# Patient Record
Sex: FEMALE | Race: WHITE | NOT HISPANIC OR LATINO | Employment: OTHER | URBAN - METROPOLITAN AREA
[De-identification: names, ages, dates, MRNs, and addresses within clinical notes are randomized per-mention and may not be internally consistent; named-entity substitution may affect disease eponyms.]

---

## 2023-05-23 LAB
6-ACETYLMORPHINE: <25 NG/ML
7-AMINOCLONAZEPAM: >1000 NG/ML
ALPHA-HYDROXYALPRAZOLAM: <25 NG/ML
ALPHA-HYDROXYMIDAZOLAM: <25 NG/ML
ALPRAZOLAM: <25 NG/ML
AMPHETAMINE (PRESENCE) IN URINE BY SCREEN METHOD: ABNORMAL
BARBITURATES PRESENCE IN URINE BY SCREEN METHOD: ABNORMAL
CANNABINOIDS IN URINE BY SCREEN METHOD: ABNORMAL
CHLORDIAZEPOXIDE: <25 NG/ML
CLONAZEPAM: 40 NG/ML
COCAINE (PRESENCE) IN URINE BY SCREEN METHOD: ABNORMAL
CODEINE: <50 NG/ML
CREATINE, URINE FOR DRUG: 209.2 MG/DL
DIAZEPAM: <25 NG/ML
DRUG SCREEN COMMENT URINE: ABNORMAL
EDDP: <25 NG/ML
FENTANYL CONFIRMATION, URINE: <2.5 NG/ML
HYDROCODONE: <25 NG/ML
HYDROMORPHONE: <25 NG/ML
LORAZEPAM: <25 NG/ML
METHADONE CONFIRMATION,URINE: <25 NG/ML
MIDAZOLAM: <25 NG/ML
MORPHINE URINE: <50 NG/ML
NORDIAZEPAM: <25 NG/ML
NORFENTANYL: <2.5 NG/ML
NORHYDROCODONE: <25 NG/ML
NOROXYCODONE: >1000 NG/ML
O-DESMETHYLTRAMADOL: <50 NG/ML
OXAZEPAM: <25 NG/ML
OXYCODONE: 1187 NG/ML
OXYMORPHONE: 869 NG/ML
PHENCYCLIDINE (PRESENCE) IN URINE BY SCREEN METHOD: ABNORMAL
TEMAZEPAM: <25 NG/ML
TRAMADOL: <50 NG/ML
ZOLPIDEM METABOLITE (ZCA): <25 NG/ML
ZOLPIDEM: <25 NG/ML

## 2023-10-27 ENCOUNTER — TELEPHONE (OUTPATIENT)
Dept: NEUROLOGY | Facility: HOSPITAL | Age: 53
End: 2023-10-27

## 2023-10-27 NOTE — TELEPHONE ENCOUNTER
[facility] called on a refill for pt.  Methocarbamol 500mg phone [phone], fax [phone]

## 2023-11-27 DIAGNOSIS — M62.89 MUSCLE STIFFNESS: ICD-10-CM

## 2023-11-27 RX ORDER — BACLOFEN 10 MG/1
30 TABLET ORAL 3 TIMES DAILY
COMMUNITY
End: 2023-11-27 | Stop reason: SDUPTHER

## 2023-11-27 RX ORDER — BACLOFEN 10 MG/1
30 TABLET ORAL 3 TIMES DAILY
Qty: 270 TABLET | Refills: 11 | Status: SHIPPED | OUTPATIENT
Start: 2023-11-27 | End: 2024-11-26

## 2023-12-04 RX ORDER — NORTRIPTYLINE HYDROCHLORIDE 25 MG/1
75 CAPSULE ORAL NIGHTLY
Qty: 270 CAPSULE | Refills: 3 | Status: SHIPPED | OUTPATIENT
Start: 2023-12-04 | End: 2024-12-03

## 2024-01-02 ENCOUNTER — TELEPHONE (OUTPATIENT)
Dept: NEUROLOGY | Facility: HOSPITAL | Age: 54
End: 2024-01-02

## 2024-01-16 DIAGNOSIS — G25.82 STIFF PERSON SYNDROME: ICD-10-CM

## 2024-01-16 RX ORDER — CLONAZEPAM 1 MG/1
3 TABLET ORAL NIGHTLY
COMMUNITY
End: 2024-01-16 | Stop reason: SDUPTHER

## 2024-01-16 RX ORDER — CLONAZEPAM 1 MG/1
3 TABLET ORAL NIGHTLY
Qty: 270 TABLET | Refills: 1 | Status: SHIPPED | OUTPATIENT
Start: 2024-01-16

## 2024-03-21 ENCOUNTER — TELEPHONE (OUTPATIENT)
Dept: NEUROLOGY | Facility: HOSPITAL | Age: 54
End: 2024-03-21
Payer: MEDICARE

## 2024-03-21 NOTE — TELEPHONE ENCOUNTER
I returned Karyna's call regarding her port used for infusions. Recently the infusion nurse cho had difficulty accessing the port at times, and there also seem to be indications that it could be blocked a little. I instructed her to reach out to the office that placed the port so that they could properly assess the problem and correct it. She expressed an understanding, and said she would contact them.

## 2024-05-30 DIAGNOSIS — T82.9XXS: Primary | ICD-10-CM

## 2024-06-04 DIAGNOSIS — G25.82 STIFF PERSON SYNDROME WITH POSITIVE GLUTAMIC ACID DECARBOXYLASE (GAD) ANTIBODY: Primary | ICD-10-CM

## 2024-06-04 DIAGNOSIS — Z79.899 LONG-TERM USE OF HIGH-RISK MEDICATION: ICD-10-CM

## 2024-06-04 DIAGNOSIS — R76.0 STIFF PERSON SYNDROME WITH POSITIVE GLUTAMIC ACID DECARBOXYLASE (GAD) ANTIBODY: Primary | ICD-10-CM

## 2024-06-04 RX ORDER — TIZANIDINE HYDROCHLORIDE 4 MG/1
8 CAPSULE, GELATIN COATED ORAL 3 TIMES DAILY
COMMUNITY
End: 2024-06-04 | Stop reason: SDUPTHER

## 2024-06-05 RX ORDER — TIZANIDINE HYDROCHLORIDE 4 MG/1
8 CAPSULE, GELATIN COATED ORAL 3 TIMES DAILY
Qty: 180 CAPSULE | Refills: 11 | Status: SHIPPED | OUTPATIENT
Start: 2024-06-05 | End: 2025-06-05

## 2024-06-14 ENCOUNTER — APPOINTMENT (OUTPATIENT)
Dept: CARDIOLOGY | Facility: HOSPITAL | Age: 54
End: 2024-06-14
Payer: MEDICARE

## 2024-06-17 NOTE — PROGRESS NOTES
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 She was previously seen 1/12/23 and continues to be stable on her current medication and IVIG regimen.    Since her previous appointment, she reports having spasms from cheeks to chin about 3 x weekly, lasting a few minutes and resolves.  She continues to feel that walking longer distances, standing longer period of times, and climbing stairs have been more difficult even with her forearm crutch with increasing pain that stems from her back. She denies any falls but feels that her back will give out.  She has difficulty associating which issue is causing this but her muscle stiffness and spasms have been controlled on her current medication and IVIG regimen. She continues to take Baclofen 30 mg 3 x daily, Tizanidine 8 mg 3 x daily, clonazepam 3 mg at bedtime, Methocarbamol 500 mg 3 x daily. She tolerates her medications well with little to no side effects.     She continues to receive pulse IVIG 40 G daily x 2 days every 2 weeks through [facility] which helps some with her stiffness and weakness throughout.    To recap, she has had almost 20-year history of muscle stiffness and spasms that started in the neck and spread into her upper limbs and lower limbs. Diagnosis was ultimately made more than 11 years ago with elevated glutamic acid decarboxylase antibody. She was started on pulse IVIG in addition to baclofen, and tizanidine. She has been ordered prednisone in the past which seemed to have little to no improvement to her symptoms.     Otherwise, the past medical history, social history, and review of systems were reviewed. There are no significant changes.     Medications:     Current Outpatient  Medications:     baclofen (Lioresal) 10 mg tablet, Take 3 tablets by mouth three times daily at 8am, 1pm and 11pm., Disp: 270 tablet, Rfl: 10    baclofen (Lioresal) 10 mg tablet, Take 3 tablets (30 mg) by mouth 3 times a day. To take at 8am 1pm and 11pm, Disp: 270 tablet, Rfl: 11    clonazePAM (KlonoPIN) 1 mg tablet, Take 3 tablets (3 mg) by mouth once daily at bedtime. To take at 11 pm, Disp: 270 tablet, Rfl: 1    nortriptyline (Pamelor) 25 mg capsule, Take 3 capsules (75 mg) by mouth once daily at bedtime., Disp: 270 capsule, Rfl: 3    tiZANidine (Zanaflex) 4 mg capsule, Take 2 capsules (8 mg) by mouth 3 times a day., Disp: 180 capsule, Rfl: 11     Neuromuscular Exam:     The neurological examination was limited since this was a telemedicine visit.  The patient was alert with normal speech, cognition and fund of knowledge.     Impression:    Ms. LOLI KUNZ has CELSO-65 positive stiff person syndrome diagnosed >20 years ago with symptomatology going many years prior. She has no diabetes mellitus or history of seizure. She has been maintained on antispasticity agents as well as pulse IVIG. She is doing fairly well on this regimen. She had a Mediport which was replaced in June 2022.  This is not working now and she will need a new Mediport.  This will be her third Mediport.  We discussed replacing the Mediport versus trying subcutaneous immunoglobulin.  She elected to do the Mediport at this time.    Plan:    1.Continue intravenous immunoglobulin 40 g daily x 2 days every 2 weeks through [facility].     2. Continue baclofen 10 mg 3 times daily, tizanidine 4 mg 3 times daily and methocarbamol 500 mg 3 times daily and clonazepam 1 mg tab 3 tabs at bedtime.     3.  Consult interventional radiology regarding Mediport check and possible replacement.  An order was placed.     She will return to see me in 3 months preferably in person. She will call for questions.    I have considered the risks of abuse, dependence,  addiction, and diversion. Through further review, I believe it is clinically appropriate for Karyna Lares to be prescribed this medication. I have personally reviewed the OARRS report.      Katy Barragan M.D., F.A.C.P.   Director, [facility] & EM[facility] laboratory   [facility]   [facility]   Professor of Neurology   [facility] [employer]       The total appointment time today was 30 minutes on the day of the visit completing the review of the medical record, obtaining history a counseling and education, placing orders, independently reviewing results, communicating with the patient/family and other providers, coordinating care and performing appropriate clinical documentation.

## 2024-06-18 ENCOUNTER — TELEMEDICINE (OUTPATIENT)
Dept: NEUROLOGY | Facility: HOSPITAL | Age: 54
End: 2024-06-18
Payer: MEDICARE

## 2024-06-18 VITALS — WEIGHT: 280 LBS | BODY MASS INDEX: 43.85 KG/M2

## 2024-06-18 DIAGNOSIS — G25.82 STIFF PERSON SYNDROME WITH POSITIVE GLUTAMIC ACID DECARBOXYLASE (GAD) ANTIBODY: Primary | ICD-10-CM

## 2024-06-18 DIAGNOSIS — R76.0 STIFF PERSON SYNDROME WITH POSITIVE GLUTAMIC ACID DECARBOXYLASE (GAD) ANTIBODY: Primary | ICD-10-CM

## 2024-06-18 PROCEDURE — 99214 OFFICE O/P EST MOD 30 MIN: CPT | Mod: GT,95 | Performed by: PSYCHIATRY & NEUROLOGY

## 2024-06-18 PROCEDURE — 99214 OFFICE O/P EST MOD 30 MIN: CPT | Performed by: PSYCHIATRY & NEUROLOGY

## 2024-06-18 RX ORDER — METHOCARBAMOL 500 MG/1
500 TABLET, FILM COATED ORAL 3 TIMES DAILY
COMMUNITY

## 2024-06-19 ENCOUNTER — LAB (OUTPATIENT)
Dept: LAB | Facility: LAB | Age: 54
End: 2024-06-19
Payer: MEDICARE

## 2024-06-19 DIAGNOSIS — Z79.899 LONG-TERM USE OF HIGH-RISK MEDICATION: ICD-10-CM

## 2024-06-19 LAB
AMPHETAMINES UR QL SCN: ABNORMAL
BARBITURATES UR QL SCN: ABNORMAL
BENZODIAZ UR QL SCN: ABNORMAL
BZE UR QL SCN: ABNORMAL
CANNABINOIDS UR QL SCN: ABNORMAL
FENTANYL+NORFENTANYL UR QL SCN: ABNORMAL
METHADONE UR QL SCN: ABNORMAL
OPIATES UR QL SCN: ABNORMAL
OXYCODONE+OXYMORPHONE UR QL SCN: ABNORMAL
PCP UR QL SCN: ABNORMAL

## 2024-06-19 PROCEDURE — 80361 OPIATES 1 OR MORE: CPT

## 2024-06-19 PROCEDURE — 80365 DRUG SCREENING OXYCODONE: CPT

## 2024-06-19 PROCEDURE — 80307 DRUG TEST PRSMV CHEM ANLYZR: CPT

## 2024-06-19 PROCEDURE — 80346 BENZODIAZEPINES1-12: CPT

## 2024-06-19 RX ORDER — SODIUM CHLORIDE 9 MG/ML
100 INJECTION, SOLUTION INTRAVENOUS CONTINUOUS
Status: CANCELLED | OUTPATIENT
Start: 2024-06-19

## 2024-06-20 ENCOUNTER — HOSPITAL ENCOUNTER (OUTPATIENT)
Dept: CARDIOLOGY | Facility: HOSPITAL | Age: 54
Discharge: HOME | End: 2024-06-20
Payer: MEDICARE

## 2024-06-20 VITALS
OXYGEN SATURATION: 97 % | WEIGHT: 279.98 LBS | DIASTOLIC BLOOD PRESSURE: 68 MMHG | BODY MASS INDEX: 43.94 KG/M2 | TEMPERATURE: 99.1 F | RESPIRATION RATE: 18 BRPM | SYSTOLIC BLOOD PRESSURE: 153 MMHG | HEIGHT: 67 IN | HEART RATE: 90 BPM

## 2024-06-20 DIAGNOSIS — T82.9XXS: ICD-10-CM

## 2024-06-20 PROCEDURE — 2780000003 HC OR 278 NO HCPCS

## 2024-06-20 PROCEDURE — 7100000010 HC PHASE TWO TIME - EACH INCREMENTAL 1 MINUTE

## 2024-06-20 PROCEDURE — 2720000007 HC OR 272 NO HCPCS

## 2024-06-20 PROCEDURE — 2500000004 HC RX 250 GENERAL PHARMACY W/ HCPCS (ALT 636 FOR OP/ED): Performed by: RADIOLOGY

## 2024-06-20 PROCEDURE — 36590 REMOVAL TUNNELED CV CATH: CPT

## 2024-06-20 PROCEDURE — 76937 US GUIDE VASCULAR ACCESS: CPT

## 2024-06-20 PROCEDURE — C1788 PORT, INDWELLING, IMP: HCPCS

## 2024-06-20 PROCEDURE — 2500000005 HC RX 250 GENERAL PHARMACY W/O HCPCS: Performed by: RADIOLOGY

## 2024-06-20 PROCEDURE — 99153 MOD SED SAME PHYS/QHP EA: CPT

## 2024-06-20 PROCEDURE — C1894 INTRO/SHEATH, NON-LASER: HCPCS

## 2024-06-20 PROCEDURE — 7100000009 HC PHASE TWO TIME - INITIAL BASE CHARGE

## 2024-06-20 PROCEDURE — 77001 FLUOROGUIDE FOR VEIN DEVICE: CPT

## 2024-06-20 PROCEDURE — 99152 MOD SED SAME PHYS/QHP 5/>YRS: CPT

## 2024-06-20 PROCEDURE — 36561 INSERT TUNNELED CV CATH: CPT

## 2024-06-20 RX ORDER — ESOMEPRAZOLE MAGNESIUM 40 MG/1
40 CAPSULE, DELAYED RELEASE ORAL
COMMUNITY
Start: 2017-04-11

## 2024-06-20 RX ORDER — LIDOCAINE HYDROCHLORIDE AND EPINEPHRINE 20; 10 MG/ML; UG/ML
INJECTION, SOLUTION INFILTRATION; PERINEURAL AS NEEDED
Status: DISCONTINUED | OUTPATIENT
Start: 2024-06-20 | End: 2024-06-20 | Stop reason: HOSPADM

## 2024-06-20 RX ORDER — TRAZODONE HYDROCHLORIDE 50 MG/1
1 TABLET ORAL NIGHTLY
COMMUNITY
Start: 2022-05-24

## 2024-06-20 RX ORDER — OXYCODONE AND ACETAMINOPHEN 10; 325 MG/1; MG/1
1 TABLET ORAL 3 TIMES DAILY PRN
COMMUNITY

## 2024-06-20 RX ORDER — FENTANYL CITRATE 50 UG/ML
INJECTION, SOLUTION INTRAMUSCULAR; INTRAVENOUS AS NEEDED
Status: DISCONTINUED | OUTPATIENT
Start: 2024-06-20 | End: 2024-06-20 | Stop reason: HOSPADM

## 2024-06-20 RX ORDER — DOCUSATE SODIUM 100 MG/1
100 CAPSULE, LIQUID FILLED ORAL 2 TIMES DAILY
COMMUNITY
Start: 2023-08-15

## 2024-06-20 RX ORDER — LEVOTHYROXINE SODIUM 125 UG/1
125 TABLET ORAL
COMMUNITY
Start: 2022-05-24

## 2024-06-20 RX ORDER — CELECOXIB 200 MG/1
200 CAPSULE ORAL DAILY
COMMUNITY
Start: 2023-04-20

## 2024-06-20 RX ORDER — IMMUNE GLOBULIN (HUMAN) 10 G/100ML
20 INJECTION INTRAVENOUS; SUBCUTANEOUS ONCE
COMMUNITY
Start: 2024-04-08

## 2024-06-20 RX ORDER — HEPARIN 100 UNIT/ML
SYRINGE INTRAVENOUS AS NEEDED
Status: DISCONTINUED | OUTPATIENT
Start: 2024-06-20 | End: 2024-06-20 | Stop reason: HOSPADM

## 2024-06-20 RX ORDER — MIDAZOLAM HYDROCHLORIDE 1 MG/ML
INJECTION, SOLUTION INTRAMUSCULAR; INTRAVENOUS AS NEEDED
Status: DISCONTINUED | OUTPATIENT
Start: 2024-06-20 | End: 2024-06-20 | Stop reason: HOSPADM

## 2024-06-20 RX ORDER — BIMATOPROST 0.1 MG/ML
1 SOLUTION/ DROPS OPHTHALMIC NIGHTLY
COMMUNITY
Start: 2022-05-21 | End: 2024-07-07

## 2024-06-20 RX ORDER — SOLIFENACIN SUCCINATE 10 MG/1
10 TABLET, FILM COATED ORAL
COMMUNITY
Start: 2023-07-17

## 2024-06-20 RX ORDER — GOLIMUMAB 50 MG/4ML
2 SOLUTION INTRAVENOUS
COMMUNITY
Start: 2023-03-09

## 2024-06-20 RX ORDER — LEVOTHYROXINE SODIUM 125 UG/1
125 TABLET ORAL DAILY
COMMUNITY
End: 2024-06-20 | Stop reason: WASHOUT

## 2024-06-20 RX ORDER — SODIUM CHLORIDE 9 MG/ML
INJECTION, SOLUTION INTRAVENOUS CONTINUOUS PRN
Status: COMPLETED | OUTPATIENT
Start: 2024-06-20 | End: 2024-06-20

## 2024-06-20 ASSESSMENT — PAIN - FUNCTIONAL ASSESSMENT
PAIN_FUNCTIONAL_ASSESSMENT: 0-10
PAIN_FUNCTIONAL_ASSESSMENT: 0-10

## 2024-06-20 ASSESSMENT — PAIN SCALES - GENERAL
PAINLEVEL_OUTOF10: 0 - NO PAIN
PAINLEVEL_OUTOF10: 0 - NO PAIN

## 2024-06-20 ASSESSMENT — ENCOUNTER SYMPTOMS
CONSTITUTIONAL NEGATIVE: 1
BACK PAIN: 1
ENDOCRINE NEGATIVE: 1
GASTROINTESTINAL NEGATIVE: 1
NERVOUS/ANXIOUS: 1
ALLERGIC/IMMUNOLOGIC NEGATIVE: 1
HEMATOLOGIC/LYMPHATIC NEGATIVE: 1
EYES NEGATIVE: 1
APNEA: 1
CARDIOVASCULAR NEGATIVE: 1

## 2024-06-20 NOTE — H&P
History Of Present Illness  Karyna Lares is a 53 y.o. female presenting for CVC port check with Dr. Zepeda 6/20/24. PMHx significant for Stiff Person Syndrome, Ankylosing Spondylitis, Bipolar disorder, depression, GERD, OA, hypothyroidism, RAMONA.      Past Medical History  She has a past medical history of Ankylosing spondylitis (Multi), Arthritis, ASCUS of cervix with negative high risk HPV, Bipolar 1 disorder (Multi), Depressive disorder, Deviated septum, Encounter for adjustment and management of vascular access device (06/08/2022), GERD (gastroesophageal reflux disease), Glaucoma, History of open reduction and internal fixation (ORIF) procedure, Hypothyroid, Migraine, RAMONA (obstructive sleep apnea), Osteoarthritis, Pain in right wrist (08/07/2017), Pain in unspecified elbow (06/10/2014), Sleep apnea, Status post osteotomy, Stiff person syndrome with positive glutamic acid decarboxylase (CELSO) antibody, and Stress incontinence (female) (male).    Surgical History  She has a past surgical history that includes Other surgical history (06/08/2022); Other surgical history (06/08/2022); Other surgical history (06/08/2022); Bladder surgery; Tonsillectomy; Nasal septum surgery; Central Line (Left); Wrist surgery; Cataract extraction (Right); Ulnar nerve transposition; and Hysterectomy.     Social History  She reports that she has never smoked. She has never used smokeless tobacco. She reports that she does not use drugs. No history on file for alcohol use.    Family History  No family history on file.     Allergies  Tegaderm ag mesh [silver] and Sulfa (sulfonamide antibiotics)    Home Medications  Current Outpatient Medications on File Prior to Encounter   Medication Sig Dispense Refill    baclofen (Lioresal) 10 mg tablet Take 3 tablets by mouth three times daily at 8am, 1pm and 11pm. 270 tablet 10    celecoxib (CeleBREX) 200 mg capsule Take 1 capsule (200 mg) by mouth once daily.      clonazePAM (KlonoPIN) 1 mg tablet  Take 3 tablets (3 mg) by mouth once daily at bedtime. To take at 11 pm 270 tablet 1    docusate sodium (Colace) 100 mg capsule Take 1 capsule (100 mg) by mouth 2 times a day.      esomeprazole (NexIUM) 40 mg DR capsule Take 1 capsule (40 mg) by mouth once daily in the morning. Take before meals.      estrogens, conjugated, (Premarin) 0.3 mg tablet Take 1 tablet (0.3 mg) by mouth once daily.      Gamunex-C 20 gram/200 mL (10 %) solution Infuse 200 mL (20 g) into a venous catheter 1 time.      levothyroxine (Synthroid, Levoxyl) 125 mcg tablet Take 1 tablet (125 mcg) by mouth once daily.      linaCLOtide (Linzess) 290 mcg capsule Take 1 capsule (290 mcg) by mouth once daily.      Lumigan 0.01 % ophthalmic solution Administer 1 drop into affected eye(s) once daily at bedtime.      methocarbamol (Robaxin) 500 mg tablet Take 1 tablet (500 mg) by mouth 3 times a day.      methylphenidate HCl (RITALIN ORAL) Take 90 mg by mouth once daily (M-F).      nortriptyline (Pamelor) 25 mg capsule Take 3 capsules (75 mg) by mouth once daily at bedtime. 270 capsule 3    oxyCODONE-acetaminophen (Percocet)  mg tablet Take 1 tablet by mouth 3 times a day as needed for moderate pain (4 - 6).      solifenacin (VESIcare) 10 mg tablet Take 1 tablet (10 mg) by mouth once daily.      tiZANidine (Zanaflex) 4 mg capsule Take 2 capsules (8 mg) by mouth 3 times a day. 180 capsule 11    traZODone (Desyrel) 50 mg tablet Take 1 tablet (50 mg) by mouth once daily at bedtime.      [DISCONTINUED] levothyroxine (Synthroid, Levoxyl) 125 mcg tablet Take 1 tablet (125 mcg) by mouth early in the morning.. Take on an empty stomach at the same time each day, either 30 to 60 minutes prior to breakfast      baclofen (Lioresal) 10 mg tablet Take 3 tablets (30 mg) by mouth 3 times a day. To take at 8am 1pm and 11pm 270 tablet 11    Simponi ARIA 12.5 mg/mL solution injection Infuse 2 mg/kg into a venous catheter every 3 months.  Every 80 days per patient  "report       No current facility-administered medications on file prior to encounter.          Inpatient Medications:            Review of Systems   Constitutional: Negative.    HENT: Negative.     Eyes: Negative.    Respiratory:  Positive for apnea.    Cardiovascular: Negative.    Gastrointestinal: Negative.    Endocrine: Negative.    Genitourinary: Negative.    Musculoskeletal:  Positive for back pain and gait problem.   Skin: Negative.    Allergic/Immunologic: Negative.    Hematological: Negative.    Psychiatric/Behavioral:  The patient is nervous/anxious.           Physical Exam  General:  Patient is awake, alert, and oriented.  Patient is in no acute distress.  HEENT:  Pupils equal and reactive.  Normocephalic.  Moist mucosa.    Neck:  Difficulty assessing JVD 2/2 body habitus.   Cardiovascular:  Regular rate and rhythm.  Normal S1 and S2. No murmurs/rubs/gallops. Radial pulses 2+.   Pulmonary:  Clear to auscultation bilaterally.  Abdomen:  Soft. Non-tender.  Obese. Non-distended.  Positive bowel sounds.  Lower Extremities:  Pedal pulses 2+ No LE edema.  Neurologic:  AxOx4. HERNANDEZ x4.   Skin: Skin warm and dry, no lesions. Normal skin turgor. Right chest mediport.   Psychiatric: Anxious.      Sedation Plan    ASA 3     Mallampati class: IV.         Last Recorded Vitals  Blood pressure (!) 193/105, pulse 94, temperature 37.3 °C (99.1 °F), temperature source Temporal, resp. rate 18, height 1.702 m (5' 7\"), weight 127 kg (279 lb 15.8 oz), SpO2 97%.         Vitals from the Past 24 Hours  Heart Rate:  [94]   Temp:  [37.3 °C (99.1 °F)]   Resp:  [18]   BP: (193)/(105)   Height:  [170.2 cm (5' 7\")]   Weight:  [127 kg (279 lb 15.8 oz)]   SpO2:  [97 %]          Relevant Results    Labs    CBC:   Recent Labs     12/08/17  1538 10/23/17  1410   WBC 5.2 4.9   HGB 13.3 11.7*   HCT 41.3 36.6    145*   MCV 87 84     BMP/CMP: No results for input(s): \"NA\", \"K\", \"CL\", \"BUN\", \"CREATININE\", \"CO2\", \"CALCIUM\", \"PROT\", \"BILITOT\", " "\"ALKPHOS\", \"ALT\", \"AST\", \"GLUCOSE\" in the last 22585 hours.    No lab exists for component: \"LABALBU\"   Lipid Panel: No results for input(s): \"CHOL\", \"HDL\", \"CHHDL\", \"LDL\", \"VLDL\", \"TRIG\", \"NHDL\" in the last 09043 hours.  Cardiac       No lab exists for component: \"CK\", \"CKMBP\"   Hemoglobin A1C:   Recent Labs     02/06/23  1452 06/18/22  1103   HGBA1C 5.3 5.1     TSH/ Free T4: No results for input(s): \"TSH\", \"FREET4\" in the last 66780 hours.  Iron: No results for input(s): \"FERRITIN\", \"TIBC\", \"IRONSAT\", \"BNP\" in the last 20738 hours.  Coag:     ABO: No results found for: \"ABO\"    Past Cardiology Tests (Last 3 Years):    EKG:  No results for input(s): \"ATRRATE\", \"VENTRATE\", \"PRINT\", \"QRSDUR\", \"QTCFRED\", \"QTCCALCB\" in the last 30693 hours.No results found for this or any previous visit (from the past 4464 hour(s)).  Echo:  Echocardiogram: No results found for this or any previous visit from the past 1800 days.    Ejection Fractions:  No results found for: \"EF\"  Cath:  Coronary Angiography: No results found for this or any previous visit from the past 1800 days.    Right Heart Cath: No results found for this or any previous visit from the past 1800 days.    Stress Test:  Nuclear:No results found for this or any previous visit from the past 1800 days.    Metabolic Stress: No results found for this or any previous visit from the past 1800 days.    Cardiac Imaging:  Cardiac Scoring: No results found for this or any previous visit from the past 1800 days.    Cardiac MRI: No results found for this or any previous visit from the past 1800 days.         Assessment/Plan  Assessment/Plan   Active Problems:  There are no active Hospital Problems.      #CVC Complication  -CVC check with Dr. Zepeda today 6/20/24  -Patient decline pre-procedure Hcg screening; signed refusal in chart       I spent 30 minutes in the professional and overall care of this patient.      Tisha Peña, JESENIA-CNP    "

## 2024-06-20 NOTE — DISCHARGE INSTRUCTIONS
What is a Central Venous Access Port? Patient and Family Education    What is a Central Venous Access Port?  A central venous access port is a small device made up of 2 parts:  ? The port, which is a hollow metal or plastic disk with a rubber center and  ? The tube (also called a catheter) that connects to the port. The catheter is  threaded through a vein that leads to your heart.  A doctor places the port under the skin in the chest near the collarbone, or in the arm. The port  feels like a small bump. Once your port site heals it should not hurt. A port provides easy  access to a vein. A port is useful if you need frequent intravenous (IV) treatments, medicines,  blood tests or blood products. A port can stay in for months or years if it is cared for correctly  and working as it should. A port may also be called a Mediport, Power Port or Port-a-cath.    Before your Port is Placed:  ? If you take any medicine that thins your blood or helps prevent clots, talk with  your doctor. Before your port is placed, ask your doctor if your dose of these  medicines needs to be changed to help prevent bleeding problems. If your doctor tells  you to stop taking these medicines, ask him or her when you should restart them. If  your port placement is cancelled, call your doctor and ask if you need to restart your  medicine or change your dose. These medicines include, but are not limited to:  Warfarin (Coumadin), Lovenox (enoxaparin), Eliquis (apixaban), Plavix (clopidogrel),  Pradaxa (dabigatran) and Xarelto (rivaroxaban).  ? Do not eat or drink anything 8 hours before your port placement. If you have  been told to take certain medicines, take them with a sip of water.  ? Take your daily medicines, especially any cardiac (heart), high blood pressure, seizure,  and antibiotic medicines. If you take insulin for diabetes, ask your doctor how to adjust  your insulin dose the morning of your port placement. Be sure to tell your  doctor that  you have to fast for 8 hours before the port is placed.  ? Talk with your doctor, nurse or dietitian before taking probiotics. Ask if it’s safe for  you to take them when you have a central line. Some patients should avoid taking  certain probiotics because they may increase their chance of getting an infection.    The Day your Port is Placed:  ? A doctor in Radiology will place your port. The port placement takes about 60 to 90  minutes but plan on being here for 3 to 4 hours. This allows time for your check-in and  recovery.  ? A staff member will talk with you about the procedure, ask you questions and help  answer your questions. For women: Tell your doctor or technologist if there is any  chance you are pregnant.  ? You will be asked to change into a hospital gown for the procedure. You must remove  necklaces and earrings because they can get in the way during your port placement. An  IV will be placed in your arm and you will be asked to lay on a cart. Once we are  ready, we will take you to the procedure room. A family member may stay in our  waiting room while your port is placed.    In the Procedure Room:  You will get medicine through your IV to help you relax. While the port is placed, some  people fall asleep and some are awake but feel very relaxed. We will wash the area where the  port is being placed with a germ killing solution. This solution helps lower your chances of  getting an infection. Next, the doctor injects a numbing medicine into your skin, around the  port site. Once your skin is numb, the doctor makes 2 small incisions (cuts) to place the port  under your skin. The incisions are closed with skin glue or sutures and paper Band-Aids called  steri-strips. A gauze bandage is then placed over the port site.    After the Port is Placed:  ? You will be taken to the recovery area. We will check your pulse and blood pressure  often and check your port site for bleeding and swelling.  Some bruising is normal. If  you see bleeding, apply pressure with your fingertips and call your nurse right away. If  you feel swelling or more pain at the site, call your nurse.  ? You may have some soreness where your port is placed but it should improve each  day as the site heals. The incisions used to place the port are small and should heal in  10 to 14 days.  ? Once healed, you do not need to have a dressing over the site unless the port has a  needle in it.    If You go Home After Your Port is Placed:  ? You must have someone drive you home. We cannot let you drive or travel home alone in  a cab or on a bus. Do not drive for 24 hours after your port is placed.  ? Someone should stay with you through the night.  ? Resume your routine normal diet. You may resume your normal medicines but if you  take blood-thinning medicine, ask your doctor when you should start taking it again.  ? Rest until morning.   ? Lifting your arm on the same side of the mediport should be restricted to 10-15 pounds   or less for 1 week.  ? Avoid pushing, pulling, straining, or any strenuous activities.  ? You may climb stairs.  ? You may return to work when you feel you are ready at any point after surgery.  If you are not able to contact your doctor, go to the nearest Emergency Room.    Caring for Your Incisions:  ? Remove the gauze dressing after 48 hours. You do not need to replace it. Don’t try to peel  off the surgical glue or steri-strips. Let them fall off on their own, which often happens  after 2 weeks.  ? Do not let your incisions get wet until they are fully healed, which often takes 10 to 14  days. When you shower, cover your incisions with a dressing made from plastic wrap  (like Saran wrap or Glad Press n’ Seal) or a plastic bag and tape to keep the area dry.  After you shower, remove the plastic wrap and pat the incisions dry. Don’t swim or soak  in a tub or Jacuzzi until your incisions are fully healed.  ? Do not get  your port site wet if it has a needle in it. Follow the steps above to make  sure your port site is covered with plastic wrap or a plastic bag when you shower.  ? Look at your incisions each day. Call your doctor right away if you have any of the signs  of infection that are listed on the next page.    Caring for Your Port:  -A trained nurse must use a special non-coring needle, called a Plunkett needle, each time they  access your port. The needle is placed through your skin and into the rubber center of the port.  -You will likely feel slight pricking when your nurse inserts the needle. The needle stays in  place for your treatments and can be removed afterwards.  -Your port needs to be flushed every 4 to 6 weeks to help prevent blood clots  from forming in the catheter. If blood clots form and cause a blockage, your  port may need to be removed. Your nurse will flush your port with saline. If  needed, they may also flush it with a blood thinning medicine called heparin.  -Port flushes are done right before the needle is taken out. Some patients are  taught how to do these flushes at home. If you need to flush your port at home,  your nurse will show you how. If your port is not being used at least once  every 4 to 6 weeks, talk with your doctor or nurse about scheduling port  flushes.    Sedation:  If you received medication for sedation, it will be active in your body for approximately 24  hours. So you may feel a little sleepy. Therefore, for the next 24 hours:  ? DO NOT drive a car, operate machinery or power tools. It is recommended that a   responsible adult be with you for the first 24 hours.  ? DO NOT drink any alcoholic beverages or take any non-prescriptive medications that   contain alcohol.  ? DO NOT make any important decisions or sign any legal/important documents.    When to Call Your Doctor:  ? Redness, swelling or drainage near the port or over the port site.  ? Drainage from the port site.  ? Shake  or chills.  ? Temperature of 100.4°F (38°C) or higher.  ? Pain, warm or soreness at the port site.  ? Swelling of your arm, check at the port site.  ? Also call if the port has been moved  ? If you have any questions about your port.     How to Reach your Doctor:    Call [phone] with problems or questions    This info is a general resource. It is not meant to replace your health care provider’s advice.  Ask your doctor or health care team any questions. Always follow their instructions.

## 2024-06-20 NOTE — NURSING NOTE
Home going instructions specific to procedure given. Easily arousable; responding appropriately. Vs +/- 20% of pre procedure status. Significant complications are absent. Ambulates without dizziness/age appropriate activity, pulse ox above or equal to 92% on room air/ordered oxygen treatment. Care Plan Complete. Discharge to home accompanied by (family). Discharged via wheelchair.  PIV removed

## 2024-06-20 NOTE — POST-PROCEDURE NOTE
Interventional Radiology Brief Postprocedure Note    Attending: Adi Terrell MD    Assistant: none    Diagnosis: Ankylosing spondylitis     Description of procedure: Right side port removal with the placement a new port via the right internal jugular vein. The port can be used     Anesthesia:  Local moderate sedation    Complications: None    Estimated Blood Loss: minimal    Medications (Filter: Administrations occurring from 0945 to 0945 on 06/20/24) As of 06/20/24 0945      None          No specimens collected      See detailed result report with images in PACS.    The patient tolerated the procedure well without incident or complication and is in stable condition.

## 2024-06-24 DIAGNOSIS — G25.82 STIFF PERSON SYNDROME: ICD-10-CM

## 2024-06-24 PROBLEM — R76.0 STIFF PERSON SYNDROME WITH POSITIVE GLUTAMIC ACID DECARBOXYLASE (GAD) ANTIBODY: Status: ACTIVE | Noted: 2024-06-24

## 2024-06-24 LAB
1OH-MIDAZOLAM UR CFM-MCNC: <25 NG/ML
6MAM UR CFM-MCNC: <25 NG/ML
7AMINOCLONAZEPAM UR CFM-MCNC: >1000 NG/ML
A-OH ALPRAZ UR CFM-MCNC: <25 NG/ML
ALPRAZ UR CFM-MCNC: <25 NG/ML
CHLORDIAZEP UR CFM-MCNC: <25 NG/ML
CLONAZEPAM UR CFM-MCNC: <25 NG/ML
CODEINE UR CFM-MCNC: <50 NG/ML
DIAZEPAM UR CFM-MCNC: <25 NG/ML
HYDROCODONE CTO UR CFM-MCNC: <25 NG/ML
HYDROMORPHONE UR CFM-MCNC: <25 NG/ML
LORAZEPAM UR CFM-MCNC: <25 NG/ML
MIDAZOLAM UR CFM-MCNC: <25 NG/ML
MORPHINE UR CFM-MCNC: <50 NG/ML
NORDIAZEPAM UR CFM-MCNC: <25 NG/ML
NORHYDROCODONE UR CFM-MCNC: <25 NG/ML
NOROXYCODONE UR CFM-MCNC: >1000 NG/ML
OXAZEPAM UR CFM-MCNC: <25 NG/ML
OXYCODONE UR CFM-MCNC: 1613 NG/ML
OXYMORPHONE UR CFM-MCNC: 750 NG/ML
TEMAZEPAM UR CFM-MCNC: <25 NG/ML

## 2024-06-24 RX ORDER — CLONAZEPAM 1 MG/1
3 TABLET ORAL NIGHTLY
Qty: 270 TABLET | Refills: 0 | Status: SHIPPED | OUTPATIENT
Start: 2024-06-24 | End: 2024-09-22

## 2024-07-10 DIAGNOSIS — G25.82 STIFF-MAN SYNDROME: ICD-10-CM

## 2024-07-10 RX ORDER — BACLOFEN 10 MG/1
30 TABLET ORAL 3 TIMES DAILY
Qty: 810 TABLET | Refills: 3 | Status: SHIPPED | OUTPATIENT
Start: 2024-07-10 | End: 2025-07-10

## 2024-09-17 DIAGNOSIS — G25.82 STIFF PERSON SYNDROME: ICD-10-CM

## 2024-09-17 RX ORDER — CLONAZEPAM 1 MG/1
3 TABLET ORAL NIGHTLY
Qty: 270 TABLET | Refills: 3 | Status: SHIPPED | OUTPATIENT
Start: 2024-09-17

## 2024-09-17 RX ORDER — METHOCARBAMOL 500 MG/1
500 TABLET, FILM COATED ORAL 3 TIMES DAILY
Qty: 90 TABLET | Refills: 7 | Status: SHIPPED | OUTPATIENT
Start: 2024-09-17

## 2024-10-10 DIAGNOSIS — G25.82 STIFF PERSON SYNDROME: ICD-10-CM

## 2024-10-10 RX ORDER — METHOCARBAMOL 500 MG/1
500 TABLET, FILM COATED ORAL 3 TIMES DAILY
Qty: 270 TABLET | Refills: 3 | Status: SHIPPED | OUTPATIENT
Start: 2024-10-10 | End: 2025-10-10

## 2024-10-10 RX ORDER — CLONAZEPAM 1 MG/1
3 TABLET ORAL NIGHTLY
Qty: 270 TABLET | Refills: 0 | Status: SHIPPED | OUTPATIENT
Start: 2024-10-10 | End: 2025-01-08

## 2024-11-14 ENCOUNTER — OFFICE VISIT (OUTPATIENT)
Dept: NEUROLOGY | Facility: CLINIC | Age: 54
End: 2024-11-14
Payer: MEDICARE

## 2024-11-14 VITALS
DIASTOLIC BLOOD PRESSURE: 83 MMHG | RESPIRATION RATE: 18 BRPM | HEIGHT: 67 IN | HEART RATE: 92 BPM | SYSTOLIC BLOOD PRESSURE: 129 MMHG | BODY MASS INDEX: 44.89 KG/M2 | WEIGHT: 286 LBS

## 2024-11-14 DIAGNOSIS — G25.82 STIFF PERSON SYNDROME WITH POSITIVE GLUTAMIC ACID DECARBOXYLASE (GAD) ANTIBODY: Primary | ICD-10-CM

## 2024-11-14 DIAGNOSIS — R76.0 STIFF PERSON SYNDROME WITH POSITIVE GLUTAMIC ACID DECARBOXYLASE (GAD) ANTIBODY: Primary | ICD-10-CM

## 2024-11-14 PROCEDURE — 99214 OFFICE O/P EST MOD 30 MIN: CPT | Performed by: PSYCHIATRY & NEUROLOGY

## 2024-11-14 PROCEDURE — 3008F BODY MASS INDEX DOCD: CPT | Performed by: PSYCHIATRY & NEUROLOGY

## 2024-11-14 ASSESSMENT — COLUMBIA-SUICIDE SEVERITY RATING SCALE - C-SSRS
1. IN THE PAST MONTH, HAVE YOU WISHED YOU WERE DEAD OR WISHED YOU COULD GO TO SLEEP AND NOT WAKE UP?: NO
2. HAVE YOU ACTUALLY HAD ANY THOUGHTS OF KILLING YOURSELF?: NO
6. HAVE YOU EVER DONE ANYTHING, STARTED TO DO ANYTHING, OR PREPARED TO DO ANYTHING TO END YOUR LIFE?: NO

## 2024-11-14 ASSESSMENT — ENCOUNTER SYMPTOMS
LOSS OF SENSATION IN FEET: 0
DEPRESSION: 0
OCCASIONAL FEELINGS OF UNSTEADINESS: 1

## 2024-11-14 ASSESSMENT — PATIENT HEALTH QUESTIONNAIRE - PHQ9
2. FEELING DOWN, DEPRESSED OR HOPELESS: NOT AT ALL
SUM OF ALL RESPONSES TO PHQ9 QUESTIONS 1 AND 2: 0
1. LITTLE INTEREST OR PLEASURE IN DOING THINGS: NOT AT ALL

## 2024-11-14 ASSESSMENT — PAIN SCALES - GENERAL: PAINLEVEL_OUTOF10: 6

## 2024-11-14 NOTE — PROGRESS NOTES
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 She was previously seen 6/18/24 and continues to be stable on her current medication and IVIG regimen.  She arrives alone for her appointment.    Since her previous appointment, she feels that her symptoms have worsened and have more difficulty walking any distance, bending over, even to empty trash can, or standing any period of time.  She recently injured her achilles tendon for the 4th time which makes walking and standing worst.  Climbing stairs have been difficult even with her forearm crutch with increasing pain that stems from her back. She denies any falls but with her back pain and weakness is concerned that her back will give out.      She continues to receive pulse IVIG 40 G daily x 2 days every 2 weeks through [facility] which helps some with her stiffness and weakness throughout. Her symptoms tend to worsen a week prior to her scheduled infusion or when a delay occurs to her treatments.  She continues to take Baclofen 30 mg 3 x daily, Tizanidine 8 mg 3 x daily, clonazepam 3 mg at bedtime, Methocarbamol 500 mg 3 x daily. She tolerates her medications well with little to no side effects and has helped some with her muscle stiffness.    To recap, she has had almost 20-year history of muscle stiffness and spasms that started in the neck and spread into her upper limbs and lower limbs. Diagnosis was ultimately made more than 11 years ago with elevated glutamic acid decarboxylase antibody. She was started on pulse IVIG in addition to baclofen, clonazepam, methocarbamol and tizanidine. She did not respond to prednisone in the past.    Otherwise, the past medical history, social history, and review of systems were reviewed. There  "are no significant changes.     /83   Pulse 92   Resp 18   Ht 1.702 m (5' 7\")   Wt 130 kg (286 lb)   BMI 44.79 kg/m²     Medications:     Current Outpatient Medications:     baclofen (Lioresal) 10 mg tablet, Take 3 tablets (30 mg) by mouth 3 times a day., Disp: 810 tablet, Rfl: 3    celecoxib (CeleBREX) 200 mg capsule, Take 1 capsule (200 mg) by mouth once daily., Disp: , Rfl:     clonazePAM (KlonoPIN) 1 mg tablet, Take 3 tablets (3 mg) by mouth once daily at bedtime., Disp: 270 tablet, Rfl: 0    docusate sodium (Colace) 100 mg capsule, Take 1 capsule (100 mg) by mouth 2 times a day., Disp: , Rfl:     esomeprazole (NexIUM) 40 mg DR capsule, Take 1 capsule (40 mg) by mouth once daily in the morning. Take before meals., Disp: , Rfl:     estrogens, conjugated, (Premarin) 0.3 mg tablet, Take 1 tablet (0.3 mg) by mouth once daily., Disp: , Rfl:     Gamunex-C 20 gram/200 mL (10 %) solution, Infuse 200 mL (20 g) into a venous catheter 1 time., Disp: , Rfl:     levothyroxine (Synthroid, Levoxyl) 125 mcg tablet, Take 1 tablet (125 mcg) by mouth once daily., Disp: , Rfl:     linaCLOtide (Linzess) 290 mcg capsule, Take 1 capsule (290 mcg) by mouth once daily., Disp: , Rfl:     methocarbamol (Robaxin) 500 mg tablet, Take 1 tablet (500 mg) by mouth 3 times a day., Disp: 270 tablet, Rfl: 3    methylphenidate HCl (RITALIN ORAL), Take 90 mg by mouth once daily (M-F)., Disp: , Rfl:     nortriptyline (Pamelor) 25 mg capsule, Take 3 capsules (75 mg) by mouth once daily at bedtime., Disp: 270 capsule, Rfl: 3    oxyCODONE-acetaminophen (Percocet)  mg tablet, Take 1 tablet by mouth 3 times a day as needed for moderate pain (4 - 6)., Disp: , Rfl:     Simponi ARIA 12.5 mg/mL solution injection, Infuse 2 mg/kg into a venous catheter every 3 months.  Every 80 days per patient report, Disp: , Rfl:     solifenacin (VESIcare) 10 mg tablet, Take 1 tablet (10 mg) by mouth once daily., Disp: , Rfl:     tiZANidine (Zanaflex) 4 mg " capsule, Take 2 capsules (8 mg) by mouth 3 times a day., Disp: 180 capsule, Rfl: 11    traZODone (Desyrel) 50 mg tablet, Take 1 tablet (50 mg) by mouth once daily at bedtime., Disp: , Rfl:      Neuromuscular Exam:     On neurological examination,  the cranial nerve examination is normal with no facial weakness, tongue weakness or atrophy.Neck flexors and extensors are normal.  The motor examination reveals normal muscle tone and bulk.  Manual muscle examination is normal in all four extremities.  The deep tendon reflexes are +2 and symmetrical.  The gait is normal.  The patient is able to walk on their toes and heels without difficulty.  Tandem gait is good. The patient can get up from a chair without using hands.  Romberg test is negative.     Impression:    Ms. LOLI KUNZ has CELSO-65 positive stiff person syndrome diagnosed >20 years ago with symptomatology going many years prior. She has no diabetes mellitus or history of seizure. She has been maintained on antispasticity agents as well as pulse IVIG. She is doing fairly well on this regimen. She had a Mediport which was replaced in June 2022.  This is not working now and she will need a new Mediport.  This will be her third Mediport.  We discussed replacing the Mediport versus trying subcutaneous immunoglobulin.  She elected to do the Mediport at this time.    Plan:    1.Continue intravenous immunoglobulin 40 g daily x 2 days every 2 weeks through [facility].     2. Continue baclofen 10 mg 3 times daily, tizanidine 4 mg 3 times daily, methocarbamol 500 mg 3 times daily and clonazepam 1 mg tab 3 tabs at bedtime.      She will return to see me in 4-6  months . She will call for questions.      Katy Barragan M.D., F.A.C.P.   Director, [facility]   [facility]   [facility]   Professor of Neurology   [facility] [employer]       The total appointment  time today was 30 minutes. Time included preparing to see the patient, obtaining the history, performing a medically necessary appropriate physical examination, counseling and educating the patient, independently interpreting results  to the patient and documenting clinical information in the medical record.

## 2025-03-17 DIAGNOSIS — G25.82 STIFF PERSON SYNDROME: ICD-10-CM

## 2025-03-17 RX ORDER — CLONAZEPAM 1 MG/1
3 TABLET ORAL NIGHTLY
Qty: 270 TABLET | Refills: 0 | Status: SHIPPED | OUTPATIENT
Start: 2025-03-17 | End: 2025-06-15

## 2025-05-01 ENCOUNTER — HOSPITAL ENCOUNTER (OUTPATIENT)
Dept: RADIOLOGY | Facility: HOSPITAL | Age: 55
Discharge: HOME | End: 2025-05-01
Payer: MEDICARE

## 2025-05-01 DIAGNOSIS — Z13.6 ENCOUNTER FOR SCREENING FOR CARDIOVASCULAR DISORDERS: ICD-10-CM

## 2025-05-01 PROCEDURE — 75571 CT HRT W/O DYE W/CA TEST: CPT

## 2025-05-15 ENCOUNTER — OFFICE VISIT (OUTPATIENT)
Dept: NEUROLOGY | Facility: CLINIC | Age: 55
End: 2025-05-15
Payer: MEDICARE

## 2025-05-15 VITALS
SYSTOLIC BLOOD PRESSURE: 135 MMHG | WEIGHT: 289 LBS | RESPIRATION RATE: 18 BRPM | BODY MASS INDEX: 45.36 KG/M2 | HEART RATE: 90 BPM | HEIGHT: 67 IN | DIASTOLIC BLOOD PRESSURE: 94 MMHG

## 2025-05-15 DIAGNOSIS — R76.0 STIFF PERSON SYNDROME WITH POSITIVE GLUTAMIC ACID DECARBOXYLASE (GAD) ANTIBODY: Primary | ICD-10-CM

## 2025-05-15 DIAGNOSIS — Z79.899 HIGH RISK MEDICATION USE: ICD-10-CM

## 2025-05-15 DIAGNOSIS — G25.82 STIFF PERSON SYNDROME WITH POSITIVE GLUTAMIC ACID DECARBOXYLASE (GAD) ANTIBODY: Primary | ICD-10-CM

## 2025-05-15 PROCEDURE — 99214 OFFICE O/P EST MOD 30 MIN: CPT | Performed by: PSYCHIATRY & NEUROLOGY

## 2025-05-15 PROCEDURE — 3008F BODY MASS INDEX DOCD: CPT | Performed by: PSYCHIATRY & NEUROLOGY

## 2025-05-15 RX ORDER — MIRABEGRON 25 MG/1
25 TABLET, FILM COATED, EXTENDED RELEASE ORAL DAILY
COMMUNITY

## 2025-05-15 RX ORDER — BIMATOPROST 0.1 MG/ML
1 SOLUTION/ DROPS OPHTHALMIC NIGHTLY
COMMUNITY

## 2025-05-15 RX ORDER — AMLODIPINE BESYLATE 10 MG/1
10 TABLET ORAL DAILY
COMMUNITY

## 2025-05-15 ASSESSMENT — PAIN SCALES - GENERAL: PAINLEVEL_OUTOF10: 6

## 2025-05-15 NOTE — PROGRESS NOTES
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 She was previously seen 11/14/24 and continues to be stable on her current medication and IVIG regimen.  She arrives alone for her appointment.      Since her previous appointment, she feels that her symptoms have worsened and have more difficulty walking any distance, bending over, even to empty trash can, or standing any period of time.  She recently injured her achilles tendon for the 4th time which makes walking and standing worst.  Climbing stairs have been difficult even with her forearm crutch with increasing pain that stems from her back. She denies any falls but with her back pain and weakness is concerned that her back will give out.      Fell 2 x since seen last    She continues to receive pulse IVIG 40 G daily x 2 days every 2 weeks through [facility] which helps some with her stiffness and weakness throughout. Her symptoms tend to worsen a week prior to her scheduled infusion or when a delay occurs to her treatments.  She continues to take Baclofen 30 mg 3 x daily, Tizanidine 8 mg 3 x daily, clonazepam 3 mg at bedtime, Methocarbamol 500 mg 3 x daily. She tolerates her medications well with little to no side effects and has helped some with her muscle stiffness.    To recap, she has had almost 20-year history of muscle stiffness and spasms that started in the neck and spread into her upper limbs and lower limbs. Diagnosis was ultimately made more than 11 years ago with elevated glutamic acid decarboxylase antibody. She was started on pulse IVIG in addition to baclofen, clonazepam, methocarbamol and tizanidine. She did not respond to prednisone in the past.    Otherwise, the past medical history, social history, and review of systems were  "reviewed. There are no significant changes.     /83   Pulse 92   Resp 18   Ht 1.702 m (5' 7\")   Wt 130 kg (286 lb)   BMI 44.79 kg/m²     Medications:     Current Outpatient Medications:     baclofen (Lioresal) 10 mg tablet, Take 3 tablets (30 mg) by mouth 3 times a day., Disp: 810 tablet, Rfl: 3    celecoxib (CeleBREX) 200 mg capsule, Take 1 capsule (200 mg) by mouth once daily., Disp: , Rfl:     clonazePAM (KlonoPIN) 1 mg tablet, Take 3 tablets (3 mg) by mouth once daily at bedtime., Disp: 270 tablet, Rfl: 0    docusate sodium (Colace) 100 mg capsule, Take 1 capsule (100 mg) by mouth 2 times a day., Disp: , Rfl:     esomeprazole (NexIUM) 40 mg DR capsule, Take 1 capsule (40 mg) by mouth once daily in the morning. Take before meals., Disp: , Rfl:     estrogens, conjugated, (Premarin) 0.3 mg tablet, Take 1 tablet (0.3 mg) by mouth once daily., Disp: , Rfl:     Gamunex-C 20 gram/200 mL (10 %) solution, Infuse 200 mL (20 g) into a venous catheter 1 time., Disp: , Rfl:     levothyroxine (Synthroid, Levoxyl) 125 mcg tablet, Take 1 tablet (125 mcg) by mouth once daily., Disp: , Rfl:     linaCLOtide (Linzess) 290 mcg capsule, Take 1 capsule (290 mcg) by mouth once daily., Disp: , Rfl:     methocarbamol (Robaxin) 500 mg tablet, Take 1 tablet (500 mg) by mouth 3 times a day., Disp: 270 tablet, Rfl: 3    methylphenidate HCl (RITALIN ORAL), Take 90 mg by mouth once daily (M-F)., Disp: , Rfl:     nortriptyline (Pamelor) 25 mg capsule, Take 3 capsules (75 mg) by mouth once daily at bedtime., Disp: 270 capsule, Rfl: 3    oxyCODONE-acetaminophen (Percocet)  mg tablet, Take 1 tablet by mouth 3 times a day as needed for moderate pain (4 - 6)., Disp: , Rfl:     Simponi ARIA 12.5 mg/mL solution injection, Infuse 2 mg/kg into a venous catheter every 3 months.  Every 80 days per patient report, Disp: , Rfl:     solifenacin (VESIcare) 10 mg tablet, Take 1 tablet (10 mg) by mouth once daily., Disp: , Rfl:     tiZANidine " (Zanaflex) 4 mg capsule, Take 2 capsules (8 mg) by mouth 3 times a day., Disp: 180 capsule, Rfl: 11    traZODone (Desyrel) 50 mg tablet, Take 1 tablet (50 mg) by mouth once daily at bedtime., Disp: , Rfl:      Neuromuscular Exam:     On neurological examination,  the cranial nerve examination is normal with no facial weakness, tongue weakness or atrophy.Neck flexors and extensors are normal.  The motor examination reveals normal muscle tone and bulk.  Manual muscle examination is normal in all four extremities.  The deep tendon reflexes are +2 and symmetrical.  The gait is normal.  The patient is able to walk on their toes and heels without difficulty.  Tandem gait is good. The patient can get up from a chair without using hands.  Romberg test is negative.     Impression:    Ms. LOLI KUNZ has CELSO-65 positive stiff person syndrome diagnosed >20 years ago with symptomatology going many years prior. She has no diabetes mellitus or history of seizure. She has been maintained on antispasticity agents as well as pulse IVIG. She is doing fairly well on this regimen. She had a Mediport which was replaced in June 2022.  This is not working now and she will need a new Mediport.  This will be her third Mediport.  We discussed replacing the Mediport versus trying subcutaneous immunoglobulin.  She elected to do the Mediport at this time.    Plan:    1.Continue intravenous immunoglobulin 40 g daily x 2 days every 2 weeks through [facility].     2. Continue baclofen 10 mg 3 times daily, tizanidine 4 mg 3 times daily, methocarbamol 500 mg 3 times daily and clonazepam 1 mg tab 3 tabs at bedtime.      She will return to see me in 4-6  months . She will call for questions.      Katy Barragan M.D., F.A.C.P.   Director, [facility]   [facility]   [facility]   Professor of Neurology   [facility] [employer]       The  total appointment time today was 30 minutes. Time included preparing to see the patient, obtaining the history, performing a medically necessary appropriate physical examination, counseling and educating the patient, independently interpreting results  to the patient and documenting clinical information in the medical record.

## 2025-05-18 LAB
1OH-MIDAZOLAM UR-MCNC: NEGATIVE NG/ML
7AMINOCLONAZEPAM UR-MCNC: 1960 NG/ML
A-OH ALPRAZ UR-MCNC: NEGATIVE NG/ML
A-OH-TRIAZOLAM UR-MCNC: NEGATIVE NG/ML
AMPHETAMINES UR QL: NEGATIVE NG/ML
BARBITURATES UR QL: NEGATIVE NG/ML
BENZODIAZ UR QL: POSITIVE NG/ML
BZE UR QL: NEGATIVE NG/ML
CODEINE UR-MCNC: NEGATIVE NG/ML
CREAT UR-MCNC: 159.9 MG/DL
DRUG SCREEN COMMENT UR-IMP: ABNORMAL
FENTANYL UR QL SCN: NEGATIVE NG/ML
HYDROCODONE UR-MCNC: NEGATIVE NG/ML
HYDROMORPHONE UR-MCNC: NEGATIVE NG/ML
LORAZEPAM UR-MCNC: NEGATIVE NG/ML
METHADONE UR QL: NEGATIVE NG/ML
MORPHINE UR-MCNC: NEGATIVE NG/ML
NORDIAZEPAM UR-MCNC: NEGATIVE NG/ML
NORHYDROCODONE UR CFM-MCNC: NEGATIVE NG/ML
NOROXYCODONE UR CFM-MCNC: 1210 NG/ML
OH-ETHYLFLURAZ UR-MCNC: NEGATIVE NG/ML
OPIATES UR QL: ABNORMAL NG/ML
OXAZEPAM UR-MCNC: NEGATIVE NG/ML
OXIDANTS UR QL: NEGATIVE MCG/ML
OXYCODONE UR QL: POSITIVE NG/ML
OXYCODONE UR-MCNC: 705 NG/ML
OXYMORPHONE UR-MCNC: 240 NG/ML
PCP UR QL: NEGATIVE NG/ML
PH UR: 6 [PH] (ref 4.5–9)
QUEST NOTES AND COMMENTS: ABNORMAL
TEMAZEPAM UR-MCNC: NEGATIVE NG/ML
THC UR QL: NEGATIVE NG/ML

## 2025-06-14 DIAGNOSIS — G25.82 STIFF PERSON SYNDROME: ICD-10-CM

## 2025-06-14 DIAGNOSIS — G25.82 STIFF-MAN SYNDROME: ICD-10-CM

## 2025-06-16 RX ORDER — CLONAZEPAM 1 MG/1
3 TABLET ORAL NIGHTLY
Qty: 270 TABLET | Refills: 3 | Status: SHIPPED | OUTPATIENT
Start: 2025-06-16

## 2025-06-16 RX ORDER — BACLOFEN 10 MG/1
TABLET ORAL
Qty: 810 TABLET | Refills: 2 | Status: SHIPPED | OUTPATIENT
Start: 2025-06-16

## 2025-08-19 ENCOUNTER — TELEMEDICINE (OUTPATIENT)
Dept: NEUROLOGY | Facility: HOSPITAL | Age: 55
End: 2025-08-19
Payer: MEDICARE

## 2025-08-19 DIAGNOSIS — G25.82 STIFF PERSON SYNDROME WITH POSITIVE GLUTAMIC ACID DECARBOXYLASE (GAD) ANTIBODY: Primary | ICD-10-CM

## 2025-08-19 DIAGNOSIS — R76.0 STIFF PERSON SYNDROME WITH POSITIVE GLUTAMIC ACID DECARBOXYLASE (GAD) ANTIBODY: Primary | ICD-10-CM

## 2025-08-19 DIAGNOSIS — G25.82 STIFF PERSON SYNDROME: ICD-10-CM

## 2025-08-19 PROCEDURE — 99213 OFFICE O/P EST LOW 20 MIN: CPT | Performed by: PSYCHIATRY & NEUROLOGY

## 2025-08-21 RX ORDER — CLONAZEPAM 1 MG/1
3 TABLET ORAL NIGHTLY
Qty: 270 TABLET | Refills: 3 | Status: SHIPPED | OUTPATIENT
Start: 2025-08-21

## 2025-11-20 ENCOUNTER — APPOINTMENT (OUTPATIENT)
Dept: NEUROLOGY | Facility: CLINIC | Age: 55
End: 2025-11-20
Payer: MEDICARE